# Patient Record
Sex: FEMALE | URBAN - METROPOLITAN AREA
[De-identification: names, ages, dates, MRNs, and addresses within clinical notes are randomized per-mention and may not be internally consistent; named-entity substitution may affect disease eponyms.]

---

## 2022-07-29 ENCOUNTER — APPOINTMENT (RX ONLY)
Dept: URBAN - METROPOLITAN AREA CLINIC 40 | Facility: CLINIC | Age: 57
Setting detail: DERMATOLOGY
End: 2022-07-29

## 2022-07-29 DIAGNOSIS — L64.8 OTHER ANDROGENIC ALOPECIA: ICD-10-CM | Status: INADEQUATELY CONTROLLED

## 2022-07-29 PROCEDURE — ? LAB REPORTS REVIEWED

## 2022-07-29 PROCEDURE — ? PRESCRIPTION

## 2022-07-29 PROCEDURE — ? COUNSELING

## 2022-07-29 PROCEDURE — 99204 OFFICE O/P NEW MOD 45 MIN: CPT

## 2022-07-29 PROCEDURE — ? OTC TREATMENT REGIMEN

## 2022-07-29 PROCEDURE — ? DIAGNOSIS COMMENT

## 2022-07-29 PROCEDURE — ? PRESCRIPTION MEDICATION MANAGEMENT

## 2022-07-29 PROCEDURE — ? ADDITIONAL NOTES

## 2022-07-29 PROCEDURE — ? MEDICATION COUNSELING

## 2022-07-29 RX ORDER — CLOBETASOL PROPIONATE 0.5 MG/ML
SOLUTION TOPICAL
Qty: 50 | Refills: 1 | Status: ERX | COMMUNITY
Start: 2022-07-29

## 2022-07-29 RX ADMIN — CLOBETASOL PROPIONATE: 0.5 SOLUTION TOPICAL at 00:00

## 2022-07-29 ASSESSMENT — LOCATION SIMPLE DESCRIPTION DERM
LOCATION SIMPLE: RIGHT SCALP
LOCATION SIMPLE: LEFT SCALP

## 2022-07-29 ASSESSMENT — LOCATION ZONE DERM: LOCATION ZONE: SCALP

## 2022-07-29 ASSESSMENT — LOCATION DETAILED DESCRIPTION DERM
LOCATION DETAILED: RIGHT MEDIAL FRONTAL SCALP
LOCATION DETAILED: LEFT MEDIAL FRONTAL SCALP

## 2022-07-29 NOTE — PROCEDURE: MEDICATION COUNSELING
None Cibinqo Counseling: I discussed with the patient the risks of Cibinqo therapy including but not limited to common cold, nausea, headache, cold sores, increased blood CPK levels, dizziness, UTIs, fatigue, acne, and vomitting. Live vaccines should be avoided.  This medication has been linked to serious infections; higher rate of mortality; malignancy and lymphoproliferative disorders; major adverse cardiovascular events; thrombosis; thrombocytopenia and lymphopenia; lipid elevations; and retinal detachment.

## 2022-07-29 NOTE — PROCEDURE: DIAGNOSIS COMMENT
Comment: *Hair loss x 4+ yrs but more extreme this past year.  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  Taking biotin and D3 2,000 IU daily.  Labs recently drawn at [facility] and reviewed, essentially normal.  Pre-diabetic.  Father w/ hair thinning.  Trial of clobetasol and minoxidil qhs and f/u in 6w. Comment: *Hair loss x 4+ yrs but more extreme this past year.  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  Taking biotin and D3 2,000 IU daily.  Labs recently drawn at [facility] and reviewed, essentially normal.  Pre-diabetic.  Father w/ hair thinning.  Trial of clobetasol and minoxidil qhs and f/u in 6w.

## 2022-07-29 NOTE — PROCEDURE: COUNSELING
Detail Level: Detailed
Minoxidil Recommendations: Begin with minoxidil 2% and if no complications or scalp irritation then advance to 5%.

## 2022-07-29 NOTE — PROCEDURE: LAB REPORTS REVIEWED
Detail Level: Zone
Summarized Lab Results: TSH: 2.18\\nVitamin D: 28\\nA1C: 5.8 (04/2022)\\nALT: 47 (04/2022)\\Syed: Negative (10/2021)

## 2022-07-29 NOTE — PROCEDURE: MEDICATION COUNSELING
LM #2   Taltz Counseling: I discussed with the patient the risks of ixekizumab including but not limited to immunosuppression, serious infections, worsening of inflammatory bowel disease and drug reactions.  The patient understands that monitoring is required including a PPD at baseline and must alert us or the primary physician if symptoms of infection or other concerning signs are noted.

## 2022-07-29 NOTE — PROCEDURE: PRESCRIPTION MEDICATION MANAGEMENT
Detail Level: Zone
Initiate Treatment: -clobetasol 0.05 % scalp solution
Render In Strict Bullet Format?: Yes

## 2022-07-29 NOTE — PROCEDURE: OTC TREATMENT REGIMEN
Patient Specific Otc Recommendations (Will Not Stick From Patient To Patient): -Rogaine QHS
Detail Level: Zone

## 2022-08-22 ENCOUNTER — APPOINTMENT (RX ONLY)
Dept: URBAN - METROPOLITAN AREA CLINIC 40 | Facility: CLINIC | Age: 57
Setting detail: DERMATOLOGY
End: 2022-08-22

## 2022-08-22 DIAGNOSIS — Z41.9 ENCOUNTER FOR PROCEDURE FOR PURPOSES OTHER THAN REMEDYING HEALTH STATE, UNSPECIFIED: ICD-10-CM

## 2022-08-22 PROCEDURE — ? COSMETIC QUOTE

## 2022-08-22 PROCEDURE — ? RECOMMENDATIONS

## 2022-08-22 PROCEDURE — ? BOTOX (U OR CC)

## 2022-08-22 PROCEDURE — ? COSMETIC CONSULTATION: FILLERS

## 2022-08-22 PROCEDURE — ? ADDITIONAL NOTES

## 2022-08-22 ASSESSMENT — LOCATION SIMPLE DESCRIPTION DERM
LOCATION SIMPLE: RIGHT CHEEK
LOCATION SIMPLE: RIGHT EYEBROW
LOCATION SIMPLE: LEFT CHEEK
LOCATION SIMPLE: LEFT TEMPLE
LOCATION SIMPLE: LEFT EYEBROW
LOCATION SIMPLE: RIGHT TEMPLE
LOCATION SIMPLE: GLABELLA

## 2022-08-22 ASSESSMENT — LOCATION DETAILED DESCRIPTION DERM
LOCATION DETAILED: LEFT SUPERIOR LATERAL MALAR CHEEK
LOCATION DETAILED: GLABELLA
LOCATION DETAILED: LEFT MEDIAL EYEBROW
LOCATION DETAILED: RIGHT MEDIAL EYEBROW
LOCATION DETAILED: LEFT CENTRAL EYEBROW
LOCATION DETAILED: RIGHT SUPERIOR MEDIAL BUCCAL CHEEK
LOCATION DETAILED: LEFT INFERIOR TEMPLE
LOCATION DETAILED: LEFT SUPERIOR MEDIAL BUCCAL CHEEK
LOCATION DETAILED: RIGHT INFERIOR TEMPLE
LOCATION DETAILED: RIGHT LATERAL EYEBROW
LOCATION DETAILED: LEFT LATERAL EYEBROW
LOCATION DETAILED: RIGHT CENTRAL EYEBROW

## 2022-08-22 ASSESSMENT — LOCATION ZONE DERM: LOCATION ZONE: FACE

## 2022-08-22 NOTE — PROCEDURE: COSMETIC QUOTE
Injectable 12 Percentage Discount: 0
Injectable 1 Price/Unit (In Dollars- Use Only Numbers And Decimals): 700
Patient Deposit Or Prepayments (Use Numbers Only): 0.00
Injectable 2: Voluma Ultra XC
Injectable 2 Units: 2
Include Sales Tax: No
Facility Fee Units (Optional): 1
Injectable 2 Mejia/Unit (In Dollars- Use Only Numbers And Decimals): 800
Detail Level: Zone
Include Sales Tax On Surgeon's Fees: Yes
Injectable 1: restylane

## 2022-08-22 NOTE — HPI: COSMETIC CONSULTATION
Additional History: Patient presents for CC. Patient was interested in Botox but main concern is nasolabial folds. Patient would like provider evaluation and recommendations

## 2022-08-22 NOTE — PROCEDURE: RECOMMENDATIONS
Recommendations (Free Text): 2 syringes Restylane @$700/syringe or Voluma Ultra XC @$800/syringe
Detail Level: Zone
Recommendation Preamble: The following recommendations were made during the visit:
Render Risk Assessment In Note?: no

## 2022-09-09 ENCOUNTER — APPOINTMENT (RX ONLY)
Dept: URBAN - METROPOLITAN AREA CLINIC 40 | Facility: CLINIC | Age: 57
Setting detail: DERMATOLOGY
End: 2022-09-09

## 2022-09-09 DIAGNOSIS — Z02.9 ENCOUNTER FOR ADMINISTRATIVE EXAMINATIONS, UNSPECIFIED: ICD-10-CM
